# Patient Record
Sex: MALE | Race: WHITE | NOT HISPANIC OR LATINO | Employment: OTHER | URBAN - METROPOLITAN AREA
[De-identification: names, ages, dates, MRNs, and addresses within clinical notes are randomized per-mention and may not be internally consistent; named-entity substitution may affect disease eponyms.]

---

## 2017-02-24 ENCOUNTER — TELEPHONE (OUTPATIENT)
Dept: INTERNAL MEDICINE | Facility: CLINIC | Age: 62
End: 2017-02-24

## 2017-02-24 NOTE — TELEPHONE ENCOUNTER
Received outside message that this patient may be taking meloxicam (mobic) at the same time as celebrex. Please inform to only take one of these as they are both anti-inflammatories and can lead to GI bleed/gastritis if taking both at same time.

## 2017-03-27 ENCOUNTER — OFFICE VISIT (OUTPATIENT)
Dept: INTERNAL MEDICINE | Facility: CLINIC | Age: 62
End: 2017-03-27
Payer: COMMERCIAL

## 2017-03-27 ENCOUNTER — LAB VISIT (OUTPATIENT)
Dept: LAB | Facility: HOSPITAL | Age: 62
End: 2017-03-27
Attending: FAMILY MEDICINE
Payer: COMMERCIAL

## 2017-03-27 VITALS
WEIGHT: 230.63 LBS | HEIGHT: 72 IN | TEMPERATURE: 97 F | BODY MASS INDEX: 31.24 KG/M2 | DIASTOLIC BLOOD PRESSURE: 80 MMHG | HEART RATE: 70 BPM | SYSTOLIC BLOOD PRESSURE: 120 MMHG

## 2017-03-27 DIAGNOSIS — F41.1 GAD (GENERALIZED ANXIETY DISORDER): ICD-10-CM

## 2017-03-27 DIAGNOSIS — E78.5 HYPERLIPIDEMIA, UNSPECIFIED HYPERLIPIDEMIA TYPE: ICD-10-CM

## 2017-03-27 DIAGNOSIS — B18.2 CHRONIC HEPATITIS C WITHOUT HEPATIC COMA: ICD-10-CM

## 2017-03-27 DIAGNOSIS — K21.9 GASTROESOPHAGEAL REFLUX DISEASE, ESOPHAGITIS PRESENCE NOT SPECIFIED: ICD-10-CM

## 2017-03-27 DIAGNOSIS — E88.819 INSULIN RESISTANCE: ICD-10-CM

## 2017-03-27 DIAGNOSIS — G25.81 RLS (RESTLESS LEGS SYNDROME): ICD-10-CM

## 2017-03-27 DIAGNOSIS — R80.9 PROTEINURIA, UNSPECIFIED TYPE: ICD-10-CM

## 2017-03-27 DIAGNOSIS — E78.5 HYPERLIPIDEMIA, UNSPECIFIED HYPERLIPIDEMIA TYPE: Primary | ICD-10-CM

## 2017-03-27 DIAGNOSIS — Z00.00 ROUTINE GENERAL MEDICAL EXAMINATION AT A HEALTH CARE FACILITY: ICD-10-CM

## 2017-03-27 LAB
25(OH)D3+25(OH)D2 SERPL-MCNC: 30 NG/ML
ALBUMIN SERPL BCP-MCNC: 4.2 G/DL
ALP SERPL-CCNC: 94 U/L
ALT SERPL W/O P-5'-P-CCNC: 25 U/L
ANION GAP SERPL CALC-SCNC: 10 MMOL/L
AST SERPL-CCNC: 23 U/L
BASOPHILS # BLD AUTO: 0.04 K/UL
BASOPHILS NFR BLD: 0.5 %
BILIRUB SERPL-MCNC: 0.8 MG/DL
BUN SERPL-MCNC: 16 MG/DL
CALCIUM SERPL-MCNC: 9.7 MG/DL
CHLORIDE SERPL-SCNC: 105 MMOL/L
CHOLEST/HDLC SERPL: 2.7 {RATIO}
CO2 SERPL-SCNC: 25 MMOL/L
CREAT SERPL-MCNC: 1 MG/DL
CREAT UR-MCNC: 291 MG/DL
DIFFERENTIAL METHOD: ABNORMAL
EOSINOPHIL # BLD AUTO: 0.3 K/UL
EOSINOPHIL NFR BLD: 3.1 %
ERYTHROCYTE [DISTWIDTH] IN BLOOD BY AUTOMATED COUNT: 13.1 %
EST. GFR  (AFRICAN AMERICAN): >60 ML/MIN/1.73 M^2
EST. GFR  (NON AFRICAN AMERICAN): >60 ML/MIN/1.73 M^2
FERRITIN SERPL-MCNC: 117 NG/ML
GLUCOSE SERPL-MCNC: 82 MG/DL
HCT VFR BLD AUTO: 47.1 %
HDL/CHOLESTEROL RATIO: 36.7 %
HDLC SERPL-MCNC: 150 MG/DL
HDLC SERPL-MCNC: 55 MG/DL
HGB BLD-MCNC: 15.8 G/DL
LDLC SERPL CALC-MCNC: 72.6 MG/DL
LYMPHOCYTES # BLD AUTO: 1.7 K/UL
LYMPHOCYTES NFR BLD: 21.2 %
MAGNESIUM SERPL-MCNC: 2 MG/DL
MCH RBC QN AUTO: 31.3 PG
MCHC RBC AUTO-ENTMCNC: 33.5 %
MCV RBC AUTO: 93 FL
MICROALBUMIN UR DL<=1MG/L-MCNC: 12 UG/ML
MICROALBUMIN/CREATININE RATIO: 4.1 UG/MG
MONOCYTES # BLD AUTO: 0.7 K/UL
MONOCYTES NFR BLD: 8 %
NEUTROPHILS # BLD AUTO: 5.4 K/UL
NEUTROPHILS NFR BLD: 67.1 %
NONHDLC SERPL-MCNC: 95 MG/DL
PLATELET # BLD AUTO: 218 K/UL
PMV BLD AUTO: 10.4 FL
POTASSIUM SERPL-SCNC: 4.6 MMOL/L
PROT SERPL-MCNC: 7.2 G/DL
RBC # BLD AUTO: 5.05 M/UL
SODIUM SERPL-SCNC: 140 MMOL/L
T4 FREE SERPL-MCNC: 1 NG/DL
TRIGL SERPL-MCNC: 112 MG/DL
TSH SERPL DL<=0.005 MIU/L-ACNC: 1.59 UIU/ML
WBC # BLD AUTO: 8.1 K/UL

## 2017-03-27 PROCEDURE — 99214 OFFICE O/P EST MOD 30 MIN: CPT | Mod: S$GLB,,, | Performed by: FAMILY MEDICINE

## 2017-03-27 PROCEDURE — 1160F RVW MEDS BY RX/DR IN RCRD: CPT | Mod: S$GLB,,, | Performed by: FAMILY MEDICINE

## 2017-03-27 PROCEDURE — 36415 COLL VENOUS BLD VENIPUNCTURE: CPT | Mod: PO

## 2017-03-27 PROCEDURE — 85025 COMPLETE CBC W/AUTO DIFF WBC: CPT

## 2017-03-27 PROCEDURE — 82306 VITAMIN D 25 HYDROXY: CPT

## 2017-03-27 PROCEDURE — 80061 LIPID PANEL: CPT

## 2017-03-27 PROCEDURE — 84439 ASSAY OF FREE THYROXINE: CPT

## 2017-03-27 PROCEDURE — 83036 HEMOGLOBIN GLYCOSYLATED A1C: CPT

## 2017-03-27 PROCEDURE — 83735 ASSAY OF MAGNESIUM: CPT

## 2017-03-27 PROCEDURE — 82570 ASSAY OF URINE CREATININE: CPT

## 2017-03-27 PROCEDURE — 99999 PR PBB SHADOW E&M-EST. PATIENT-LVL III: CPT | Mod: PBBFAC,,, | Performed by: FAMILY MEDICINE

## 2017-03-27 PROCEDURE — 82728 ASSAY OF FERRITIN: CPT

## 2017-03-27 PROCEDURE — 84443 ASSAY THYROID STIM HORMONE: CPT

## 2017-03-27 PROCEDURE — 80053 COMPREHEN METABOLIC PANEL: CPT

## 2017-03-27 RX ORDER — CITALOPRAM 40 MG/1
TABLET, FILM COATED ORAL
Qty: 90 TABLET | Refills: 3 | Status: SHIPPED | OUTPATIENT
Start: 2017-03-27

## 2017-03-27 RX ORDER — ATORVASTATIN CALCIUM 40 MG/1
TABLET, FILM COATED ORAL
Qty: 90 TABLET | Refills: 3 | Status: SHIPPED | OUTPATIENT
Start: 2017-03-27

## 2017-03-27 RX ORDER — OMEPRAZOLE 40 MG/1
40 CAPSULE, DELAYED RELEASE ORAL DAILY
Qty: 90 CAPSULE | Refills: 3 | Status: SHIPPED | OUTPATIENT
Start: 2017-03-27

## 2017-03-27 RX ORDER — METFORMIN HYDROCHLORIDE 500 MG/1
TABLET, EXTENDED RELEASE ORAL
Qty: 90 TABLET | Refills: 3 | Status: SHIPPED | OUTPATIENT
Start: 2017-03-27

## 2017-03-27 RX ORDER — LISINOPRIL 5 MG/1
5 TABLET ORAL DAILY
Qty: 90 TABLET | Refills: 3 | Status: SHIPPED | OUTPATIENT
Start: 2017-03-27 | End: 2018-03-27

## 2017-03-27 RX ORDER — CLONAZEPAM 1 MG/1
TABLET ORAL
Qty: 60 TABLET | Refills: 5 | Status: SHIPPED | OUTPATIENT
Start: 2017-03-27 | End: 2017-11-03 | Stop reason: SDUPTHER

## 2017-03-28 LAB
ESTIMATED AVG GLUCOSE: 111 MG/DL
HBA1C MFR BLD HPLC: 5.5 %

## 2017-03-29 NOTE — PROGRESS NOTES
Subjective:      Patient ID: Steve House is a 61 y.o. male.    Chief Complaint: chronic issues    HPI Comments:                                               The patient is a 61 year-old gentleman coming in today for followup and prevention exam.      Type 2 DM with metabolic syndrome with insulin resistance- He is taking his metformin.  Has lost actually about 15 pounds since I last saw him.  Needing to still do his blood work.  Going to be moving to [place], [place] soon. Still  obese. Still working daily.   Planning on retiring soon.      Now on lisinopril 5mg due to microalbuminuria. Wanting to repeat today.   Blood pressures also still controlled and have been.  No complaints of urinary issues.     He is now on lipitor 40 for cholesterol. No myalagias.    Needing lab work.    Has a history of general anxiety disorder with some depression symptoms.  Currently controlled with Celexa and Klonopin BID, does have restless leg symptoms     He also reports bilateral knee pain worse than before  did okay with mobic but not currently on.    Has a history of hepatitis C status post treatment with interferon.  Has done PCR testing in the past which was negative.          Lab Results   Component Value Date    WBC 8.10 03/27/2017    HGB 15.8 03/27/2017    HCT 47.1 03/27/2017     03/27/2017    CHOL 150 03/27/2017    TRIG 112 03/27/2017    HDL 55 03/27/2017    ALT 25 03/27/2017    AST 23 03/27/2017     03/27/2017    K 4.6 03/27/2017     03/27/2017    CREATININE 1.0 03/27/2017    BUN 16 03/27/2017    CO2 25 03/27/2017    TSH 1.592 03/27/2017    PSA 0.41 10/05/2012    GLUF 108 05/20/2011    HGBA1C 5.5 03/27/2017       Review of Systems   Constitutional: Negative for chills and fatigue.        Intentional weight loss     HENT: Negative for congestion, ear pain, postnasal drip, sneezing, sore throat and trouble swallowing.    Eyes: Negative for pain and visual disturbance.   Respiratory: Negative for cough  and shortness of breath.    Cardiovascular: Negative for chest pain and leg swelling.   Gastrointestinal: Negative for abdominal pain, constipation, diarrhea, nausea and vomiting.   Endocrine: Negative for cold intolerance and heat intolerance.   Genitourinary: Negative for difficulty urinating, dysuria and flank pain.   Musculoskeletal: Negative for arthralgias, back pain, joint swelling and neck pain.   Skin: Negative for color change and rash.   Neurological: Negative for dizziness, seizures and headaches.   Psychiatric/Behavioral: Negative for behavioral problems and dysphoric mood.     Objective:     Physical Exam   Constitutional: He appears well-developed and well-nourished.   HENT:   Head: Normocephalic and atraumatic.   Right Ear: Tympanic membrane normal.   Left Ear: Tympanic membrane normal.   Mouth/Throat: Oropharynx is clear and moist.   Eyes: Conjunctivae and EOM are normal.   Neck: Normal range of motion. Neck supple.   Cardiovascular: Normal rate and regular rhythm.    Pulmonary/Chest: Effort normal and breath sounds normal.   Abdominal: Soft. Bowel sounds are normal. He exhibits no distension. There is no tenderness.   Psychiatric: He has a normal mood and affect. His behavior is normal.   Nursing note and vitals reviewed.    Assessment:     1. Hyperlipidemia, unspecified hyperlipidemia type    2. Insulin resistance    3. JOSE M (generalized anxiety disorder)    4. Gastroesophageal reflux disease, esophagitis presence not specified    5. RLS (restless legs syndrome)    6. Routine general medical examination at a health care facility    7. Chronic hepatitis C without hepatic coma    8. Proteinuria, unspecified type      Plan:   Steve was seen today for chronic issues.    Diagnoses and all orders for this visit:    Hyperlipidemia, unspecified hyperlipidemia type  Comments:  stable, on atorvastatin 40mg. doing labs today. no side effects.   Orders:  -     Ferritin; Future  -     CBC auto differential;  Future  -     Comprehensive metabolic panel; Future  -     Lipid panel; Future  -     Hemoglobin A1c; Future  -     TSH; Future  -     T4, free; Future  -     Vitamin D; Future  -     Magnesium; Future    Insulin resistance  Comments:  stable, on metformin daily. doing labs today. no side effects.   Orders:  -     Ferritin; Future  -     CBC auto differential; Future  -     Comprehensive metabolic panel; Future  -     Lipid panel; Future  -     Hemoglobin A1c; Future  -     TSH; Future  -     T4, free; Future  -     Vitamin D; Future  -     Magnesium; Future    JOSE M (generalized anxiety disorder)  Comments:  stable, with celexa 40mg and klonopin up to bid, doing labs today. no side effects.   Orders:  -     Ferritin; Future  -     CBC auto differential; Future  -     Comprehensive metabolic panel; Future  -     Lipid panel; Future  -     Hemoglobin A1c; Future  -     TSH; Future  -     T4, free; Future  -     Vitamin D; Future  -     Magnesium; Future    Gastroesophageal reflux disease, esophagitis presence not specified  Comments:  on omeprazole 40mg daily. stable.   Orders:  -     Ferritin; Future  -     CBC auto differential; Future  -     Comprehensive metabolic panel; Future  -     Lipid panel; Future  -     Hemoglobin A1c; Future  -     TSH; Future  -     T4, free; Future  -     Vitamin D; Future  -     Magnesium; Future    RLS (restless legs syndrome)  Comments:  stopped drinking alcohol during week, klonopin helping, but wanting to check labs.   Orders:  -     Ferritin; Future  -     CBC auto differential; Future  -     Comprehensive metabolic panel; Future  -     Lipid panel; Future  -     Hemoglobin A1c; Future  -     TSH; Future  -     T4, free; Future  -     Vitamin D; Future  -     Magnesium; Future    Routine general medical examination at a health care facility  Comments:  wanting to do today since retiring.     Chronic hepatitis C without hepatic coma  Comments:  s/p interferon tx in past 2003.      Proteinuria, unspecified type  -     Microalbumin/creatinine urine ratio    Other orders  -     omeprazole (PRILOSEC) 40 MG capsule; Take 1 capsule (40 mg total) by mouth once daily.  -     metformin (GLUCOPHAGE-XR) 500 MG 24 hr tablet; TAKE 1 TABLET BY MOUTH EVERY DAY *TAKE WITH FOOD*  -     lisinopril (PRINIVIL,ZESTRIL) 5 MG tablet; Take 1 tablet (5 mg total) by mouth once daily. For kidney protection  -     clonazePAM (KLONOPIN) 1 MG tablet; TAKE 1 TABLET BY MOUTH TWICE DAILY AS NEEDED FOR ANXIETY **MAY CAUSE DIZZINESS OR DROWSINESS** and RLS  -     citalopram (CELEXA) 40 MG tablet; TAKE 1 TABLET BY MOUTH ONCE A DAY  -     atorvastatin (LIPITOR) 40 MG tablet; TAKE 1 TABLET BY MOUTH ONCE A DAY **AVOID GRAPEFRUIT**            Return in about 6 months (around 9/27/2017) for chronic issues.

## 2017-07-05 ENCOUNTER — TELEPHONE (OUTPATIENT)
Dept: INTERNAL MEDICINE | Facility: CLINIC | Age: 62
End: 2017-07-05

## 2017-07-05 NOTE — TELEPHONE ENCOUNTER
----- Message from Joe Smith sent at 7/5/2017  2:27 PM CDT -----  Contact: Saulo Vernon [facility] is calling to schedule a hospital follow up for pt within the 3-5 days, no appointments until August, pt would like a call back to discuss appointment options..[phone] (home)

## 2017-07-05 NOTE — TELEPHONE ENCOUNTER
Pt says, he went to [facility] because he was having chest pains. He was fine, just dehydration. He was advised to f/u with his pcp. Offered pt an appt with [facility]. Pt declined. He stated, he didn't think that would be efficient. He prefers to see . Informed pt that I will send message to  and asked if he can be worked in. He verbalized understanding.

## 2017-07-06 ENCOUNTER — OFFICE VISIT (OUTPATIENT)
Dept: INTERNAL MEDICINE | Facility: CLINIC | Age: 62
End: 2017-07-06

## 2017-07-06 VITALS
TEMPERATURE: 98 F | WEIGHT: 231.25 LBS | DIASTOLIC BLOOD PRESSURE: 78 MMHG | BODY MASS INDEX: 31.32 KG/M2 | HEIGHT: 72 IN | HEART RATE: 74 BPM | SYSTOLIC BLOOD PRESSURE: 124 MMHG

## 2017-07-06 DIAGNOSIS — F41.9 TREMOR, ANXIETY RELATED: ICD-10-CM

## 2017-07-06 DIAGNOSIS — E78.5 HYPERLIPIDEMIA, UNSPECIFIED HYPERLIPIDEMIA TYPE: ICD-10-CM

## 2017-07-06 DIAGNOSIS — R25.1 TREMOR, ANXIETY RELATED: ICD-10-CM

## 2017-07-06 DIAGNOSIS — F12.10 TETRAHYDROCANNABINOL (THC) USE DISORDER, MILD, ABUSE: ICD-10-CM

## 2017-07-06 DIAGNOSIS — R55 SYNCOPE, UNSPECIFIED SYNCOPE TYPE: Primary | ICD-10-CM

## 2017-07-06 DIAGNOSIS — F41.1 GAD (GENERALIZED ANXIETY DISORDER): ICD-10-CM

## 2017-07-06 PROCEDURE — 99999 PR PBB SHADOW E&M-EST. PATIENT-LVL III: CPT | Mod: PBBFAC,,, | Performed by: FAMILY MEDICINE

## 2017-07-06 PROCEDURE — 99213 OFFICE O/P EST LOW 20 MIN: CPT | Mod: PBBFAC,PO | Performed by: FAMILY MEDICINE

## 2017-07-06 PROCEDURE — 99214 OFFICE O/P EST MOD 30 MIN: CPT | Mod: S$PBB,,, | Performed by: FAMILY MEDICINE

## 2017-07-07 NOTE — PROGRESS NOTES
Subjective:      Patient ID: Steve House is a 62 y.o. male.    Chief Complaint: Hospital Follow Up    Patient's coming in today for recent hospital follow-up from [facility] on July 6.  Basically the patient was sitting outside drinking beer all day long with his son and then smoked a joint and basically got dehydrated.  He initially felt nauseated sweaty and dizzy and felt like he is about to pass out so son helped him to lay him on the ground.  Once he was laying down he felt slightly better since gotten up and sat in a chair however once he was sitting in a chair he got worse.  He states that his eyesight a very wide he started having chest pain and he got extremely nervous.  They ended up calling EMS and he had very low blood pressure by the time EMS got there.  They took him to the closest hospital [facility].  There they performed a blood test EKGs and kept him for observation.  CBC was noted to be normal creatinine was noted be normal light lites normal urine drug screen was positive for marijuana.  CK-MB were negatives troponin was negative LDH was normal total cholesterol 148 triglycerides 224 a she'll is 48 LDL was 55 A1c is 5.6 blood alcohol level was less than 3.  He ended up with having serial EKGs chest x-ray and echocardiogram done along with carotid Dopplers performed.  He was told that these were all normal.  He was discharged home and told to follow-up with his PCP within 3-5 business days.  He has had full resolution of syncope.  He is still very anxious and concerned mainly now because of concern about the financial difficulty because his insurance is mainly only a wellness policy.  He's concerned about the large financial bili is going to have that they did help him to apply for Medicaid was in the emergency room.  We discussed that since he is retired he needs to start taking better care of himself.  He is agreeing to this as well.  He  does report that he's had worse of a tremor since he returned he also quit drinking alcohol during that time.  He no low drink 3-4 beverages per night.  He finds the Klonopin does help him to sleep at night however lately he's been more having symptoms of a tremor but seems to be worsened by anxiety and stress.        Lab Results   Component Value Date    WBC 8.10 03/27/2017    HGB 15.8 03/27/2017    HCT 47.1 03/27/2017     03/27/2017    CHOL 150 03/27/2017    TRIG 112 03/27/2017    HDL 55 03/27/2017    ALT 25 03/27/2017    AST 23 03/27/2017     03/27/2017    K 4.6 03/27/2017     03/27/2017    CREATININE 1.0 03/27/2017    BUN 16 03/27/2017    CO2 25 03/27/2017    TSH 1.592 03/27/2017    PSA 0.41 10/05/2012    GLUF 108 05/20/2011    HGBA1C 5.5 03/27/2017       Review of Systems   Constitutional: Positive for activity change. Negative for chills, fatigue and unexpected weight change.   HENT: Negative for congestion, ear pain, postnasal drip, sneezing, sore throat and trouble swallowing.    Eyes: Negative for pain and visual disturbance.   Respiratory: Negative for cough and shortness of breath.    Cardiovascular: Negative for chest pain and leg swelling.   Gastrointestinal: Negative for abdominal pain, constipation, diarrhea, nausea and vomiting.   Endocrine: Negative for cold intolerance and heat intolerance.   Genitourinary: Negative for difficulty urinating, dysuria and flank pain.   Musculoskeletal: Negative for arthralgias, back pain, joint swelling and neck pain.   Skin: Negative for color change and rash.   Neurological: Positive for tremors and syncope. Negative for dizziness, seizures, weakness and headaches.   Psychiatric/Behavioral: Negative for behavioral problems and dysphoric mood. The patient is nervous/anxious.      Objective:     Physical Exam   Constitutional: He is oriented to person, place, and time. He appears well-developed and well-nourished. No distress.   HENT:   Head:  Normocephalic and atraumatic.   Right Ear: External ear normal.   Left Ear: External ear normal.   Nose: Nose normal.   Mouth/Throat: Oropharynx is clear and moist.   Eyes: EOM are normal. Pupils are equal, round, and reactive to light.   Neck: Normal range of motion. Neck supple. No thyromegaly present.   Cardiovascular: Normal rate and regular rhythm.  Exam reveals no gallop and no friction rub.    No murmur heard.  Pulmonary/Chest: Effort normal and breath sounds normal. No respiratory distress.   Abdominal: Soft. Bowel sounds are normal. He exhibits no distension. There is no tenderness. There is no rebound.   Musculoskeletal: Normal range of motion.   Lymphadenopathy:     He has no cervical adenopathy.   Neurological: He is alert and oriented to person, place, and time. He displays tremor. No cranial nerve deficit or sensory deficit. He exhibits normal muscle tone. He displays no seizure activity. Coordination and gait normal.   Skin: Skin is warm and dry.   Psychiatric: His mood appears anxious.   Vitals reviewed.    Assessment:     1. Syncope, unspecified syncope type    2. Tremor, anxiety related    3. JOSE M (generalized anxiety disorder)    4. Hyperlipidemia, unspecified hyperlipidemia type    5. Tetrahydrocannabinol (THC) use disorder, mild, abuse      Plan:   Steve was seen today for hospital follow up.    Diagnoses and all orders for this visit:    Syncope, unspecified syncope type-recently with consumption of alcohol and marijuana use with dehydration now fully resolved.  Labs and external hospital records reviewed.  No further workup indicated    Tremor, anxiety related-related anxiety discussed medications.  At this time still willing to stay off alcohol continue Celexa continue Klonopin at night    JOSE M (generalized anxiety disorder)-worse lately due to concerns about financial strain.  Advised patient to look into Medicaid.  Continue citalopram and Klonopin    Hyperlipidemia, unspecified hyperlipidemia  type-at goal ranges work on diet and exercise continue with atorvastatin    Tetrahydrocannabinol (THC) use disorder, mild, abuse-one time occurrence patient reports she will not use again            Return if symptoms worsen or fail to improve.

## 2017-09-22 ENCOUNTER — TELEPHONE (OUTPATIENT)
Dept: INTERNAL MEDICINE | Facility: CLINIC | Age: 62
End: 2017-09-22

## 2017-09-22 NOTE — TELEPHONE ENCOUNTER
Several faxed request from pharmacy services for lidocaine ointment has came on  Several patients and this is not a company we use, I called and consulted with patient and he declines requesting or knowing anything about this and says he does not have any back pain.aa

## 2017-10-20 DIAGNOSIS — E11.9 TYPE 2 DIABETES MELLITUS WITHOUT COMPLICATION: ICD-10-CM

## 2017-11-03 RX ORDER — CLONAZEPAM 1 MG/1
TABLET ORAL
Qty: 60 TABLET | Refills: 3 | Status: SHIPPED | OUTPATIENT
Start: 2017-11-03

## 2018-04-13 DIAGNOSIS — E11.9 TYPE 2 DIABETES MELLITUS WITHOUT COMPLICATION: ICD-10-CM

## 2018-09-20 ENCOUNTER — PATIENT OUTREACH (OUTPATIENT)
Dept: ADMINISTRATIVE | Facility: HOSPITAL | Age: 63
End: 2018-09-20

## 2018-09-20 NOTE — PROGRESS NOTES
Contacted patient to schedule annual appointment.   Pt states he has moved and has switched PCP's. Patient doesn't remember her name at this time.

## 2018-12-17 ENCOUNTER — PATIENT OUTREACH (OUTPATIENT)
Dept: ADMINISTRATIVE | Facility: HOSPITAL | Age: 63
End: 2018-12-17

## 2023-05-31 ENCOUNTER — PATIENT MESSAGE (OUTPATIENT)
Dept: RESEARCH | Facility: HOSPITAL | Age: 68
End: 2023-05-31